# Patient Record
Sex: MALE | Race: WHITE
[De-identification: names, ages, dates, MRNs, and addresses within clinical notes are randomized per-mention and may not be internally consistent; named-entity substitution may affect disease eponyms.]

---

## 2020-02-04 ENCOUNTER — HOSPITAL ENCOUNTER (OUTPATIENT)
Dept: HOSPITAL 46 - OPS | Age: 70
Discharge: HOME | End: 2020-02-04
Attending: SURGERY
Payer: MEDICARE

## 2020-02-04 VITALS — WEIGHT: 202.01 LBS | BODY MASS INDEX: 30.62 KG/M2 | HEIGHT: 68 IN

## 2020-02-04 DIAGNOSIS — K57.30: ICD-10-CM

## 2020-02-04 DIAGNOSIS — D12.3: ICD-10-CM

## 2020-02-04 DIAGNOSIS — Z86.010: ICD-10-CM

## 2020-02-04 DIAGNOSIS — D12.2: ICD-10-CM

## 2020-02-04 DIAGNOSIS — Z79.899: ICD-10-CM

## 2020-02-04 DIAGNOSIS — Z98.890: ICD-10-CM

## 2020-02-04 DIAGNOSIS — Z88.1: ICD-10-CM

## 2020-02-04 DIAGNOSIS — Z12.11: Primary | ICD-10-CM

## 2020-02-04 PROCEDURE — G0500 MOD SEDAT ENDO SERVICE >5YRS: HCPCS

## 2020-02-04 PROCEDURE — 0DBL8ZZ EXCISION OF TRANSVERSE COLON, VIA NATURAL OR ARTIFICIAL OPENING ENDOSCOPIC: ICD-10-PCS | Performed by: SURGERY

## 2020-02-04 PROCEDURE — 0DBK8ZZ EXCISION OF ASCENDING COLON, VIA NATURAL OR ARTIFICIAL OPENING ENDOSCOPIC: ICD-10-PCS | Performed by: SURGERY

## 2020-02-04 NOTE — NUR
02/04/20 1656 Elizabeth Rae 1547 PT ARRIVED IN PACU SLEEPY. ABD FIRM. ENCOURAGED TO PASS
FLATUS. 1600 DR AT BEDSIDE TALKING WITH PT. 1615 PASSING FLATUS. ABD
SOFT. 1630 SITTING AT SIDE OF BED SIPPING ON WATER. 1645 DC
INSTRUCTIONS GIVEN. ALL QUESTIONS ANSWERED.

## 2020-02-05 NOTE — OR
[facility]
                                    [address], [place]  [address]
_________________________________________________________________________________________
                                                                 Signed   
 
 
DATE OF OPERATION:
02/04/2020
 
SURGEON:
Dilcia Lopez MD
 
PREOPERATIVE DIAGNOSIS:
History of polyps, last colonoscopy 10 years ago.
 
POSTOPERATIVE DIAGNOSES:
1. Extensive diverticulosis of left colon and transverse colon.
2. Polyps x3 (splenic flexure and right mid colon).
 
PROCEDURE:
Total colonoscopy to cecum with cold snare polypectomy x2, cold morcellation polypectomy
x1. 
 
ANESTHESIA:
Intravenous sedation, fentanyl 200 mcg, Versed 7 mg.
 
INDICATION:
A 69-year-old white man, a patient of Dr. Tr Issa at [facility], is
here for surveillance colonoscopy.  His last colonoscopy was 10 years ago.  Five years
prior to that, he was noted to have polyps.  He has no symptoms of bleeding, diarrhea,
or constipation.  He is admitted to undergo colonoscopy.  He understands the risk of
bleeding, infection, perforation. 
 
FINDINGS:
The prep was excellent.  Complete colonoscopy was undertaken of the cecum without
question.  He had extensive diverticular changes of sigmoid and left colon.  There were
2 polyps, one a bit more sessile and flat at the splenic flexure excised with cold snare
technique and another in the mid ascending colon, smaller, but also similarly excised.
Another small polyp near the right colon polyp was excised with cold morcellation
technique.  There were no complications. 
 
DESCRIPTION OF PROCEDURE:
The patient was brought to endoscopy suite and placed in lateral decubitus position,
given intravenous sedation to the point of slurred speech and nystagmus.  Full
cardiopulmonary monitoring was maintained.  Digital rectal examination was normal. 
 
An Olympus video colonoscope was passed in the rectum and manipulated into the sigmoid
where numerous diverticula were seen.  With various maneuvers, the scope was passed
 
    Electronically Signed By: DILCIA LOPEZ MD  02/05/20 1144
_________________________________________________________________________________________
PATIENT NAME:     RAINE ADAME                        
MEDICAL RECORD #: [id number]            OPERATIVE REPORT              
          ACCT #: [id number]  
DATE OF BIRTH:   [birth year]            REPORT #: [id number]      
PHYSICIAN:        DILCIA LOPEZ MD                 
PCP:              NO PRIMARY CARE PHYSICIAN     
REPORT IS CONFIDENTIAL AND NOT TO BE RELEASED WITHOUT AUTHORIZATION
 
 
                                  [facility]
                                    [address], [place]  [address]
_________________________________________________________________________________________
                                                                 Signed   
 
 
beyond this.  At about the splenic flexure was a sessile, somewhat multilobulated polyp.
 It was not pedunculated.  This was excised with cold snare technique without problem in
its entirety.  It was removed and passed for Pathology.  Scope was advanced beyond this
ultimately to the cecum.  Upon reaching the cecum, the scope was carefully withdrawn and
in the mid ascending colon were 2 small polyps, one was excised with combination of cold
snare and cold morcellation technique and the other with cold polypectomy technique.
Both were seen sent in the same container considering the proximity.  The scope was then
withdrawn further and there were no other findings other than the diverticula.
Retroflexed view of the rectum was normal.  Scope was removed.  The patient was taken to
recovery room in good condition. 
 
CONCLUDING DIAGNOSIS:
Polyps x3.
 
PLAN:
Recommend repeat colonoscopy in 3 years, sooner if clinically indicated.  Recommend
high-fiber diet as well.  He will return to the ongoing care of Dr. Tr Issa at
the [facility]. 
 
 
 
            ________________________________________
            Dilcia Lopez MD 
 
 
JM/MODL
Job #:  [id number]
DD:  02/04/2020 15:49:57
DT:  02/04/2020 21:24:17
 
cc:            Tr Issa MD
 
 
Copies:  TR ISSA MD
~
 
 
 
 
 
 
 
 
    Electronically Signed By: DILCIA LOPEZ MD  02/05/20 1144
_________________________________________________________________________________________
PATIENT NAME:     RAINE ADAME SHIVANI                        
MEDICAL RECORD #: [id number]            OPERATIVE REPORT              
          ACCT #: [id number]  
DATE OF BIRTH:   [birth year]            REPORT #: [id number]      
PHYSICIAN:        DILCIA LOPEZ MD                 
PCP:              NO PRIMARY CARE PHYSICIAN     
REPORT IS CONFIDENTIAL AND NOT TO BE RELEASED WITHOUT AUTHORIZATION

## 2020-02-06 NOTE — PATH
[facility]
                                    [address]
                                  [place], [place]  [address]
_________________________________________________________________________________________
                                                                 Signed   
 
 
 
SPECIMEN(S): A SIGMOID POLYP
SPECIMEN(S): B RIGHT MID COLON POLYPS
 
SPECIMEN SOURCE:
A. SIGMOID POLYP
B. RIGHT MID COLON POLYPS
 
CLINICAL HISTORY:
Hx of polyps.  Post: Colon polyps, diverticulosis.
MICROSCOPIC DESCRIPTION:
Histologic sections of all submitted blocks are examined by light microscopy. 
These findings, together with the gross examination, support the pathologic 
diagnosis. 
 
FINAL PATHOLOGIC DIAGNOSIS:
A.  Colon, splenic flexure, polyp, polypectomy:
-  Fragments of tubular adenoma.
-  Negative for high-grade dysplasia or malignancy.
B.  Colon, mid ascending, polyps, polypectomy:
-  Fragments of tubular adenoma(s).
-  Negative for high-grade dysplasia or malignancy.
NAL:cml:C2NR
 
GROSS DESCRIPTION:
Two specimens are received in two containers, labeled "DB."
A.  The specimen, labeled "DB, splenic flexure polyp," is received in formalin 
and consists of one pink-tan soft tissue fragment(s) that measure 0.7 cm in 
greatest dimension. The specimen is trisected 
and entirely submitted in cassette (A1).
B.  The specimen, labeled "DB, right colon and cardia polyps," is received in 
formalin and consists of for pink-tan soft tissue fragment(s) that measure 
0.1-0.4 cm in greatest dimension. The specimen 
is entirely submitted in cassette (B1).
JS (under the direct supervision of a pathologist)
The Gross Description was prepared using a voice recognition system.  The 
report was reviewed for accuracy; however, sound-alike word errors, addition 
and/or deletions may occur.  If there is any 
question about this report, please contact Client Services.
 
PERFORMING LABORATORY:
The technical component was performed by [facility], [address], 
 
                                                                                    
_________________________________________________________________________________________
PATIENT NAME:     RAINE ADAME                        
MEDICAL RECORD #: [id number]            PATHOLOGY                     
          ACCT #: [id number]       ACCESSION #: [id number]     
DATE OF BIRTH:   [birth year]            REPORT #: [id number]       
PHYSICIAN:        ANNE PATHOLOGY              
PCP:              NO PRIMARY CARE PHYSICIAN     
REPORT IS CONFIDENTIAL AND NOT TO BE RELEASED WITHOUT AUTHORIZATION
 
 
                                  [facility]
                                    [address], [place]  [address]
_________________________________________________________________________________________
                                                                 Signed   
 
 
[place] [address] (Medical Director: Isa Sampson MD; CLIA# [id number]). 
Professional interpretation was performed by 
[facility], [address] [address] (CLIA# [id number]). 
 
Diagnostician:  Maria Dolores Ferrer MD
Pathologist
Electronically Signed 02/06/2020
 
 
Copies:                                
~
 
 
 
 
 
 
 
 
 
 
 
 
 
 
 
 
 
 
 
 
 
 
 
 
 
 
 
 
 
 
 
                                                                                    
_________________________________________________________________________________________
PATIENT NAME:     RAINE ADAME                        
MEDICAL RECORD #: [id number]            PATHOLOGY                     
          ACCT #: [id number]       ACCESSION #: [id number]     
DATE OF BIRTH:   [birth year]            REPORT #: [id number]       
PHYSICIAN:        ANNE PATHOLOGY              
PCP:              NO PRIMARY CARE PHYSICIAN     
REPORT IS CONFIDENTIAL AND NOT TO BE RELEASED WITHOUT AUTHORIZATION

## 2025-03-16 NOTE — NUR
PATIENT DESATTED TO 87% AT REST. O2 FLOW TURNED TO 1L NC WITH IMPROVEMENT TO
94%.  PATIENT IS RESTING IN BED WITH EYES CLOSED. RESPIRATIONS EVEN AND
UNLABORED.

## 2025-03-16 NOTE — NUR
REVIEWED LATEST BP OF 86/60 (70) WITH DR. LOPEZ. NEXT LR BOLUS HUNG AND
INFUSING. ORDER RECEIVED TO CHANGE VALDOVINOS BAG TO UROMETER AND MONITOR UO Q1H.

## 2025-03-16 NOTE — NUR
ABG DRAWN FROM LEFT WRIST W/O COMPLICATION. KIERRA TEST PERFORMED FIRST SHOWING
GOOD CIRCULATION. LAB IN ROOM TO COLLECT ABG.

## 2025-03-16 NOTE — NUR
LAB IN ROOM TO DRAW LACTATE. NORMAN TUBE DRAIN EMPTIED FOR 30ML SEROSANGIUNOUS
DRAINAGE. UMBILICAL SITE DRESSING NOTED TO HAVE SHADOWING BUT UNCHANGED FROM
PREVIOUS ASSESSMENT; T-TUBE LUER LOCK SITE
NOTED TO BE SLIGHTLY LEAKING. ENSURED
LEUR LOCK IS SECURE. TRANSPARENT DRESSING IS CDI. GAUZE PLACED AT LEUR SITE.
REMAINING ABDOMINAL DRESSINGS ARE CDI. PATIENT CONTINUES TO REST. DENIES NEEDS
AT THIS TIME. CALL LIGHT IN REACH.

## 2025-03-16 NOTE — NUR
Patient resting in bed with eyes closed, even and unlabored RR on RA. VSS.
Tse draining quinn colored urine. Prepared for surgery, no further needs.

## 2025-03-16 NOTE — NUR
ATTEMPTED VALDOVINOS CATHETER PLACEMENT UNDER STERILE TECHNIQUE WITH 3
UNSUCCESSFULL ATTEMPTS; ONCE WITH A 16F, A COUDE AND LASTLY WITH A 10F.
ENCOURAGED PATIENT TO ATTEMPT TO VOID. DR. LOPEZ NOTIFIED. ORDER RECEIVED TO
HAVE UROJET AVAILABLE FOR WHEN DR. LOPEZ COMES TO SEE PT.

## 2025-03-16 NOTE — NUR
Report received from Prisca CROUCH. Patient resting in bed with eyes closed, even
and unlabored respirations. IV lines checked at bedside, patient has no
apparent needs, call light in reach. Will continue plan of care.

## 2025-03-16 NOTE — NUR
PATIENT WAKES TO TAKE SIPS OF WATER. ASSESSMENT AS CHARTED. LUNG SOUNDS CLEAR
THROUGHOUT. PATIENT AGAIN DENIES PAIN AND VERBALIZED UNDERSTANDING TO NOTIFY
NURSING STAFF IF EXPERIENCING PAIN/NEED FOR PAIN MEDICATION. NORMAN DRAIN
EMPTIED FOR 15ML OF SEROSANGUINOUS DRAINAGE. T-TUBE HAS SCANT AMOUNT OF YELLOW
DRAINAGE IN COLLECTION BAG.  DRESSINGS HAVE OLD SHADOWING, NO NEW DRAINAGE
NOTED. IV SITES FLUSHED AND REMAIN PATENT. LR INFUSING AT 65ML/HR AND ZOYSN
INFUSION CONTINUES. PATIENT DENIES NEEDS OR CONCERNS AT THIS TIME. CALL LIGHT
IN REACH.

## 2025-03-16 NOTE — NUR
IV PEPCID GIVEN AND ZOYSN HUNG. PATIENT RESTING BUT OPENS EYES TO SOUNDS IN
ROOM. DENIES PAIN; EDUCATION PROVIDED ON PAIN CONTROL AND PATIENT AGREES TO
NOTIFY RN OF INCREASE IN PAIN IF ANY.

## 2025-03-16 NOTE — NUR
Assessment complete. IV K infusing per order, IVF bolus infusing. Pt resting
in bed with no needs at this time. Lungs clear on RA, HRR. Hypotension noted,
MD aware.

## 2025-03-16 NOTE — NUR
PT HAD EXPIRATORY WHEEZES AND COARSENESS POST SURGERY , STILL COMING OUT FROM
SURGERY , ORAL AIRWAY INPLACED MOD JAW THRUST PRROVIDED BY RN , NONREBREATHER
, PT NOT WAKEFUL TO STIMULI AT THIS TIME, SUCTION DOWN ORAL AIRWAY AND NT WITH
LITTLE AMOUNT OF SECRETIONS , RACEMIC/ ALBUTERAL GIVEN , ABG DRAWN , DR LOPEZ
AND FREDA PROVIDER AT BEDSIDE . BIPAP STARTED . REPEAT ABG AT 1800 .

## 2025-03-16 NOTE — NUR
REPORT RECEIVED FROM MIKO DONALD.   PATIENT WAS RESTING IN BED WITH FAMILY AT
BEDSIDE. PATIENT REQUESTS SIPS OF WATER. BIPAP REMOVED AND PLACED ON SIMPLE
MASK ON 11L. O2 SATS MAINTAINED 97%.  IV LASIX GIVEN. IVF REDUCED TO 65ML/HR
PER ORDER. IV SITES PATENT AND FLUSH EASILY. HADLEY DRAIN NOTED TO HAVE SMALL
ABOUT OF SEROSANGUINOUS DRAINAGE; T-TUBE COLLECTION BAG HAS SCANT AMOUNT IN
BAG BUT TUBING APPEARS TO HAVE YELLOW DRAINAGE, ADJUSTED FOR DRAIN TO GRAVITY.
EDUCATION PROVIDED TO PATIENT ON NOT ATTEMPTING TO TOUCH OR ADJUST TUBES AND
ALLOW FOR NURSING STAFF TO ASSIST. PATIENT IS ALERT AND ORIENTED TO PERSON,
PLACE, TIME AND SITUATION BUT APPEARS SLIGHTY CONFUSED. SIMPLE MASK FLOW
REDUCED TO 4L WITH SATS AT 95%; SWITCHED PATIENT TO NASAL CANNULA AND
PATIENT SATTING AT 95% ON 4L. LUNG SOUNDS CLEAR TO DIM WITH SOME EXPIRATORY
WHEEZING. PATIENT DENIES PAIN OR DISCOMFORT; EDUCATION PROVIDED ON AND
REQUESTED PATIENT NOTIFY NURSING STAFF FOR PAIN CONTROL. GOWN CHANGES AND WARM
BLANKETS PROVIDED. CALL LIGHT IN REACH.

## 2025-03-16 NOTE — NUR
Dr Chang in to round. New IV to L hand. New orders for IV bolus. Pt updated on
plan of care, wife at bedside.

## 2025-03-16 NOTE — NUR
Tse bag changed to urometer for accurate UOP. Pt signs surgery consent. IVF
infusing per order. Scheduled medications administered. pt has no c/o pain,
nausea. No needs at this time, wife at bedside and updated on POC.

## 2025-03-16 NOTE — NUR
UPDATED DR. LOPEZ ON PATIENT STATUS. ORDERS RECEIVED FOR AM LABS TO INCLUDE
CBC, CMP, MAGNESIUM AND LACTATE. PATIENT'S WIFE APOORVA IN ROOM VISITING. BOTH
PATIENT AND SHE DENY NEEDS. CALL LIGHT IN REACH.

## 2025-03-16 NOTE — NUR
RAINE IS ON A 1L NC AWAKE IN BED W/HOB ELEVATED. HE WAS ABLE TO COMPLETE THE
CORNET LEVEL 5 W/O DIFFICULTY. ADDITIONALLY, RAINE WAS ABLE TO USE THE
INCENTIVE SPIROMETER AND HIT 1500, ABLE TO HOLD FOR THE COUNT OF 10 AT 1250.

## 2025-03-16 NOTE — NUR
03/16/25 1713 Casandra Jensen
1527- PT ARRIVES TO ICU ROOM 128, NON REACTIVE TO STIMULUS, OPA IN
PLACE REQUIRING JAW THRUST TO MAINTAIN AIRWAY, O2 AT 15 L PER MASK.
ABD DISTENDED, DRAINS IN PLACE AND DRESSINGS. VALDOVINOS CATHETER DRAINING
NIKUNJ URINE, NO DRAINGE FROM T TUBE, SEROSANGINOUS FLUID FROM NORMAN
DRAIN. ALL MONITORS IN PLACE. CAP REFILL SLOW IN EXTREMITIES. BP
SUPPORT GIVEN IV PER CRNA.
 
1535- PT REMAINS NON REACTIVE TO STIMULUS, O2 SATS IMPROVING TO LOW
90S, CONTINUE TO NEED JAW THRUST TO MAINTAIN AIRWAY.
 
1545- NO CHANGE IN PT STATUS, PT HAS LONG EXPIRATORY PHASE AND AUDIBLE
WHEEZING. LUNG SOUNDS WITH EXPIRATORY WHEEZES THROUGHOUT. RT TO
PERFORM NEB TREATMENT.
 
1550- PT SOUNDS OF PHLEGM IN BACK OF THROAT, RT TO SUCTION, VERY
LITTLE SUCTIONED.
 
1558- RACEMIC EPI GIVEN PER RT DUE TO PT CONTINUED PROLONGED
EXPIRATORY PHASE AND UPPER AIRWAY SOUNDS OF EXHALE. ATTEMPT TO SUCTION
AGAIN WITH LITTLE RETURN.
 
1602- SATS REMAIN IN THE LOW 90'S ON SIMPLE MASK WITH O2 TURNED UP
HIGH, NRB AT 15 L PLACED, SATS IMPROVING TO UPPER 90'S.
 
1605- DILCIA CRNA BACK AT BEDSIDE TO CHECK ON PT, RT REQUESTS TO BAG PT
WITH BVM TO INCREASE SATS. DILCIA AGREES AND ASSISTS, RT TO ALSO SET UP
BIPAP.
 
1614- OPA REMOVED, PT REMAINS NON REACTIVE TO STIMULUS. BIPAP PLACED
AT THIS TIME. ICU RN AND RT TO REMAIN AT BEDSIDE WITH PT DUE TO NON
RESPONSIVENESS. LR COMPLETE.
 
1620- CARE OF PT TURNED OVER TO ODILON RN TO REMAIN AT BEDSIDE WITH
PT. BED PLUGGED IN. VALDOVINOS CATHETER DRAINING YELLOW URINE AT THIS TIME.

## 2025-03-16 NOTE — NUR
Critical lab notify of LA 4.4. Pt failed bipap weaning trial with RT, remains
on bipap at this time. CXR- signs of pulmonary edema. RT added neb tx to EMAR.
Pt is disoriented to surroundings. Attempted to phone Dr Chagn, awaiting
return call. Family at bedside and engaged in care.

## 2025-03-16 NOTE — NUR
CXR taken, patient arouses with movement and opens eyes, slight grimace noted
and patient hands move to ABD. Patient wife at bedside, attentive to patient.
Bipap in place.

## 2025-03-16 NOTE — NUR
UROMETER EMPTIED FOR 125ML URINE. URINE APPEARS TO BE LIGHT NIKUNJ COLORED.
PATIENT RESTING WITH EYES CLOSED; RESPIRATIONS EVEN AND UNLABORED WITH RR 13,
SPO2 96% ON 4L NC. PATIENT REMAINS IN SINUS RHYTHM WITH HR IN THE 60S.  CALL
LIGHT IN REACH.

## 2025-03-16 NOTE — NUR
PATIENT GOT UP TO SIDE OF BED TO VOID. PATIENT WAS ABLE TO VOID 200 ML DARK
NIKUNJ URINE. BLADDER SCANNED FOR >700ML.  CALL PLACED TO DR. LOPEZ TO REVIEW
UO, URINARY
RETENTION, BP AND HR TREND. ORDERS RECEIVED TO PLACE VALDOVINOS CATHETER FOR
URINARY RETENTION AND GIVE 1L LR BOLUS.

## 2025-03-16 NOTE — NUR
VALDOVINOS CARE COMPLETED. EMPTIED FOR 275ML NIKUNJ COLORED URINE. UROMETER IN PLACE
FOR HOURLY U/O MONITORING. PATIENT RESTING IN BED WITH EYES CLOSED. O2 SAT 96%
ON 4L NC.

## 2025-03-16 NOTE — NUR
16F VALDOVINOS CATHETER PLACED BY DR. LOPEZ WITH ONE ATTEMPT.
UROJET ADMINISTERED BY DR LOPEZ PRIOR
TO PLACEMENT. DARK NIKUNJ COLORED URINE NOTED UPON PLACEMENT. LR BOLUS
RESTARTED.  BP 88/56 (66); ORDER RECEIVED TO GIVE ANOTHER LR BOLUS WHEN THE
FIRST IS COMPLETE.

## 2025-03-16 NOTE — NUR
PATIENT ARRIVED TO UNIT VIA STRETCHER FROM ED. BEDSIDE REPORT RECEIVED FROM
SHWETA CROUCH. PATIENT IS AO X4. HE IS ACCOMPANIED BY HIS WIFE APOORVA. PATIENT
PLEASANTLY INTERACTS WITH NURSING STAFF AND ASKS QUESTIONS ABOUT HIS CARE.
PATIENT AND WIFE INTRODUCED TO NURSING STAFF, ORIENTED TO ROOM AND CALL LIGHT
FUNCTIONS. SAFETY EDUCATION REVIEWED INCLUDING CALLING FOR ASSISTANCE WHEN
NEEDING TO GET UP. URINAL AT BEDSIDE. REVIEWED PLAN OF CARE INCLUDING NPO
STATUS. PATIENT VERBALIZED UNDERSTANDING. MOUTH SWABS PROVIDED AS WELL AS
CHAPSTICK FOR DRY MOUTH. BILATERAL PERIPHERAL IVS ARE PATENT WITH + BLOOD
RETURN. D5LR HUNG AND INFUSING. RESPIRATORY THERAPIST MELLISA IN ROOM TO GIVE
ACAPELLA AND INCENTIVE SPIROMETER EDUCATION. PATIENT WAS ON 1L NC WITH O2 SATS
97%.  O2 FLOW TURNED TO OFF. WITH SATS 93%.  EDUCATION TO PATIENT THAT HIS O2
IS BEING MONITORED THIS RN WILL INCREASE O2 FLOW BACK TO ON IF HE DESATS.
PATIENT IS DIAPHORETIC AND TACHYCARDIC WITH HR IN THE HIGH 90S TO LOW 100S.
COOL WASH CLOTH GIVEN. PATIENT WISHES TO REST. DENIED NEEDS. REQUESTED HE CALL
FOR ANY ASSISTANCE, PAIN, NAUSEA OR VOMITING. CALL LIGHT IN REACH. APOORVA WENT
HOME AND WILL RETURN IN LATER AM.

## 2025-03-16 NOTE — NUR
ZOSYN INFUSING. DR. LOPEZ CALLED WITH ORDER TO STOP LR BOLUS. PATIENT RECEIVED
518ML OF THE BOLUS PRIOR TO D/C. PATIENT RESTING AT THIS TIME. RESPIRATIONS
EVEN AND UNLABORED. REMAINS ON 1L NC WITH SPO2 93%; RR 17; HR IN THE 90S. CALL
LIGHT IN REACH.

## 2025-03-17 NOTE — NUR
GIVEN MOTRIN, IV ABX. PT FEELING MILDLY NAUSEATED AND BLOATED. WALKED 100FT IN
HALLWAY SBA. PT BELCHED SEVERAL TIMES, WHICH HE REPORTS HELPED. PT BACK IN
BED, CALL LIGHT IN REACH.

## 2025-03-17 NOTE — NUR
Assessment complete. Patient sitting up in chair, on RA, A+O. Lungs clear.
HRR. Bowel tones active. VSS. Pt denies pain. Afebrile. Tse cath draining
quinn yellow urine. Patient wife at bedside and updated on plan of care.

## 2025-03-17 NOTE — NUR
THIS RN TO CCU, BEDSIDE REPORT RECEIVED FROM MIKO DONALD. PT AMBULATES FROM CCU
 ON MED-SURG, TOLERATES THIS WELL. VSS. PT SITS UP IN RECLINER, A/O X4,
GLASSES ON, BELONGINGS IN SAH BAG. WIFE AT BEDSIDE. LUNCH ARRIVES. PT FEEDS
SELF. VALDOVINOS CATHETER IN PLACE DRAINS CLEAR YELLOW URINE TO GRAVITY. NORMAN
DRAIN COMPRESSED, SEROSANGUINEOUS OUTPUT NOTED. T-TUBE DRAINS GREEN OUTPUT.
NO REPORTS OF PAIN OR DISCOMFORT AT THIS TIME. CALL LIGHT IN REACH. PT
ORIENTED TO ROOM.

## 2025-03-17 NOTE — EKG
[facility]
                                    [address]
                                  [place], [place]  [address]
_________________________________________________________________________________________
                                                                 Signed   
 
 
Sinus tachycardia
Possible Anterior infarct , age undetermined
ST \T\ T wave abnormality, consider inferior ischemia Abnormal ECG No previous ECGs
available
Confirmed by Sami Muller DO ([id number]) on 3/17/2025 5:37:26 PM
 
 
 
 
 
 
 
 
 
 
 
 
 
 
 
 
 
 
 
 
 
 
 
 
 
 
 
 
 
 
 
 
 
 
 
 
 
 
 
 
    Electronically Signed By: SAMI MULLER DO  03/17/25 1737
_________________________________________________________________________________________
PATIENT NAME:     JEANETTE ADAMEVIRAJ GALICIA                     
MEDICAL RECORD #: [id number]                     Electrocardiogram             
          ACCT #: [id number]  
DATE OF BIRTH:   [birth year]                                       
PHYSICIAN:   SAMI MULLER DO                     REPORT #: [id number]
REPORT IS CONFIDENTIAL AND NOT TO BE RELEASED WITHOUT AUTHORIZATION

## 2025-03-17 NOTE — NUR
PATIENT OPENS EYES TO THIS RN IN ROOM. ASKS ABOUT HR AND BP. REPORTED VALUES
TO PATIENT. VSS.  UO FOR THE LAST HOUR WAS 75ML.

## 2025-03-17 NOTE — NUR
VSS. PATIENT CONTINUES TO REST WITH EYES CLOSED, RESPIRATIONS EVEN AND
UNLABORED. CONTINUES ON 2L NC.

## 2025-03-17 NOTE — NUR
PATIENT WAKES TO SOUND OF HELICOPTER. UO 60ML OVER LAST HOUR. T-TUBE NOW
DRAINING DARK GREEN OUTPUT. NORMAN DRAIN CONTINUES TO HAVE SERUSAGUINOUS
DRAINAGE HOWEVER APPEARS MORE SEROUS. PATIENT DENIES PAIN. AGREEABLE TO
GETTING OOBTC LATER THIS MORNING. CALL LIGHT IN REACH.

## 2025-03-17 NOTE — NUR
RECIEVED REPORT. VITALS, GIVEN ZOFRAN AND TYLENOL FOR NAUSEA AND ABD
DISCOMFORT. ABD BLOATED, RECOMMENDED WALKING AFTER TYLENOL AND ZOFRAN TAKE
EFFECT. PT AGREES WITH PLAN. CALL LIGHT IN REACH

## 2025-03-17 NOTE — NUR
Rounding on patient. Patient was in the bathroom when doing rounding. No bowel
movement from patient. Emptied rosario bag. No request from patient. Bed has
been lowered and call light has been placed within reach

## 2025-03-17 NOTE — NUR
UR CLINICAL REVIEW:
2 NM FOR VERSALUS-PER RN
REVIEW MEETS INPT FOR ACUTE
CHOLECYSTITIS
MEDICARE
INPT 03/16/25 @ 0704
ORDER MATCHES REG
NO AUTH REQUIRED PER
MEDICARE GUIDELINES
DISCHARGE TO HOME WHEN
STABLE

## 2025-03-17 NOTE — NUR
Report received from Prisca CROUCH. Patient sitting up in chair, on RA,
independently using I.S. VSS. Will continue plan of care.

## 2025-03-17 NOTE — NUR
Spoke with Ryne. He states he lives with his wife in a 1 story home with 1
steps. He has a cane, but does not use. He and wife are active and walk at
least 1 mile a day or use the treadmill. He is a  and uses the [facility]. He
has not had a [facility] to him in the last couple of years. He would
like a Dr. in [place]. He would like to see Dr. Storey as his wife sees her.
I will add him to the clinics google list and request Dr. Storey for a pcp and
also for a fu appt. Pt denies other needs. He has a T-tube in place draining
green fluid. Pt plans on dc to home with wife when he is medically cleared. He
denies any safety or financial concerns.

## 2025-03-17 NOTE — NUR
PATIENT RESTING QUIETLY WITH EYES CLOSED. RESPIRATIONS EVEN AND UNLABORED. RR
15. SPO2 98% ON 4LNC; O2 FLOW REDUCED TO 3L.

## 2025-03-17 NOTE — NUR
PATIENT WOKE AFTER COUGHING. AFEBRILE AT THIS TIME. LUNG SOUNDS CLEAR.
ABDOMEN REMAINS DISTENDED BUT NON-TENDER TO PALPATION; BOWEL SOUNDS ACTIVE.
PATIENT ENDORSES PAIN ONLY WHEN COUGHING BUT DENIES NEED FOR PAIN MEDICATION.
REVIEWED PLAN FOR THE REMAINDER OF SHIFT INCLUDING LAB DRAW, ABX AND GETTING
OOBTC. PATIENT AGREEABLE AND DENIED QUESTIONS OR CONCERNS. FRESH WATER
PROVIDED. CALL LIGHT IN REACH.

## 2025-03-17 NOTE — NUR
Scheduled medications administered. Pt declines pain at this time. Drains
intact, rosario cath draining. Pt in good spirits and has no needs.

## 2025-03-17 NOTE — NUR
PATIENT OOBTC; TOLERATED WELL. PATIENT ENDORSES SOME PAIN AFTER ACTIVITY.
TORADOL GIVEN PER EMAR. PATIENT IS NOW OFF NASAL CANNULA AND O2 SAT IS 93-95%
ON ROOM AIR. IVF CONTINUES TO INFUSE WITHOUT DIFFICULTY. IV ZOSYN HUNG AND
INFUSING. PATIENT IS AOX4; DOES NOT RECALL THE EVENTS OF LAST NIGHT.
PLEASANTLY CONVERSANT THIS MORNING. CALL LIGHT IN REACH.

## 2025-03-17 NOTE — NUR
PT IN BED, REQUESTS TO WALK IN HALLWAY. PAIN AND NAUSEA MILDLY IMPROVED.
UNABLE TO ASSIST WITH WALK PRESESNTLY, ASSURED PT WILL RETURN TO AMBULATE. PT
AGREES WITH PLAN.
CALL LIGHT IN REACH

## 2025-03-18 NOTE — NUR
PT UP IN RECLINER. REPORTS VERY LITTLE ABD PAIN, 1/10, PT ACCEPTED OFFER OF
PRN TYLENOL FOR HS. LSC. HRR. 1+ EDEMA TO BLE. ABD MODERATELY DISTENDED, FIRM.
NON-TENDER. DENIES NAUSEA. RIGHT ABD HADLEY DRAIN W/ SCANT SEROUS DRNG. LEFT ABD
T-TUBE W/ BILE DRNG TO GRAVITY. 2 DRSG TO ABD W/ SPOTS OF DRY DRNG. SMALL INC
TO UMBILICUS W/ STERI STRIPS. DRSG TO RIGHT ABD HADLEY DRAIN SATURATED-NEW DRAIN
SPONGE APPLIED. LH SL TENDER W/ FLUSH. RAC IV INFUSING LR. PT HAS NOT VOIDED
SINCE F/C REMOVAL-WANTS TO AMBULATE  SOON TO ENC VOID. CALL LIGHT WITHIN
REACH.

## 2025-03-18 NOTE — NUR
CNA TOOK PT ON A WALK DOWN THE HALLWAY. PT WALKED ABOUT HALF THE LENGTH AND
WALKED BACK TO ROOM. CNA HELPED PT BACK INTO BED, TOOK VS, AND RE-ESTABLISHED
SCD'S. PT STATED THE HIS PAIN WAS A 1 OR 2 AND DID NOT NEED ANY MEDICATION.
CNA LEFT PT WITH CALL LIGHT WITHIN REACH.

## 2025-03-18 NOTE — NUR
PATIENT IN CHAIR AT THIS TIME. THIS CNA REMOVED PATIENTS VALDOVINOS CATHETER PER MIKO YEN REQUEST. THIS CNA ALSO EMPTIED HADLEY DRAIN, MIKO BURCH SHOWED THIS CNA
HOW TO EMPTY T TUBE. CALL LIGHT WITHIN REACH, NO FURTHER NEEDS AT THIS TIME.

## 2025-03-18 NOTE — NUR
VISITED DURING SPIRITUAL CARE ROUNDS.  PT SUPPORTED BY WIFE IN ROOM.  BOTH IN
OVERALL GOOD SPIRITS, NO IMMEDIATE NEEDS.   PROVIDED SUPPORTIVE
PRESENCE, HOSPITALITY, PRAYER.  PT AND WIFE EXPRESSED GRATITUDE, HOPE.

## 2025-03-18 NOTE — NUR
ALERT AND ORIENTED. WIFE IN ROOM. PLANNING TO DC TO HOME WHEN MEDICALLY
STABLE. WIFE STATES SHE IS CHANGING PATIENT'S FOLLOW-UP APPOINTMENT WITH DR. STEVEN FOR ANOTHER DATE BECAUSE SHE DOES NOT FEEL HE WILL BE OUT OF THE
FACILITY BY THURSDAY. DENIES CM NEEDS AT THIS TIME.

## 2025-03-18 NOTE — NUR
Hourly rounding on patient. Patient laying in bed, board has been updated and
call light has been placed within reach. No request from patient at this time

## 2025-03-18 NOTE — NUR
PATIENT AMBULATED IN HALLWAY SBA, FROM ROOM TO ROOM 112 AND BACK. PATIENT NOW
SITTING IN CHAIR, WIFE IN ROOM. CALL LIGHT IN REACH. NO FURTHER NEEDS AT THIS
TIME.

## 2025-03-18 NOTE — NUR
Hourly rounding. Patient is sitting in his recliner. He reports feeling
better. No request from patient at this time. Wife at bedside. Call light has
been put within reach

## 2025-03-18 NOTE — NUR
CNA HELPED PT TO THE BATHROOM. PT VOIDED 300 ML. CNA HELPED PT BACK INTO BED.
CNA LEFT PT WITH SCD'S ON AND CALL LIGHT WITHIN REACH.

## 2025-03-18 NOTE — NUR
PT AMBULATES IN HALLWAY WITH SBA, TOLERATES THIS WELL. PT TO RECLINER. SITS UP
AND VISITS WITH SPOUSE. PT REPORTS FLATUS. ABDOMEN CONTINUES TO DISTENDED.
DISCUSSED IMPORTANCE OF AMBULATION IN PT'S RECOVERY PT VERBALIZES
UNDERSTANDING.

## 2025-03-18 NOTE — NUR
RECEIVED REPORT FROM MIKO YEN. PT UP IN CHAIR VISITING W/ WIFE. DENIES
NEEDS OR CONCERNS AT THIS TIME.

## 2025-03-18 NOTE — NUR
PLACED CALL TO MD WITH CONCERN FOR NO AM LABS ORDERED FOR PATIENT. RECEIVED
VERBAL ORDER FOR AM LABS. VERIFIED ORDER USING THE REPEATBACK METHOD.

## 2025-03-19 NOTE — NUR
PT SITTING UP ON EDGE OF BED EATING BREAKFAST. PT HAS NO PAIN CURRENTLY AND
HAS NO CONCERNS. PT ABD IS FIRM/TENDER NEAR INCISION SITE, BUT STATES IT FEELS
THE SAME AS IT HAS THE PAST FEW DAYS WITH NO CHANGES. PT HAS CALL LIGHT IN
REACH.

## 2025-03-19 NOTE — NUR
PT SITTING UP IN CHAIR, PT HAS WIFE IN ROOM, PT STATES HIS PAIN IS 1/10 AND
HAS NO CONCERNS AT THIS TIME CALL LIGHT IN REACH.

## 2025-03-19 NOTE — NUR
REPORT RECEIVED FROM LJ CROUCH. pt RESTING IN THE CHAIR. BOARD UPDATED. pt
DENIES ANY OTHER NEEDS AT THIS TIME. CALL LIGHT WITHIN REACH.

## 2025-03-19 NOTE — NUR
CNA OBTAINED VITALS AND I&O. PT ICE WATER REFULLED. PT STATES NO FURTHER NEEDS
AT THIS TIME. CALL LIGHT WITHIN REACH.

## 2025-03-19 NOTE — NUR
PT AMBULATED THE MED/SURG BRONSON WITH ASSISTANCE FROM THIS RN. PT TOLERATED 1
LAP WELL, BUT DID STATE " HE FELT TIRED QUICKER". PT RETURNED TO CHAIR AND HAS
NO CONCERNS AT THIS TIME CALL LIGHT IN REACH.

## 2025-03-19 NOTE — NUR
Hourly rounding. Patient was assisted to the bathroom, and back to bed. Board
has been updated and call light has been placed within reach. No request from
patient. He moves independently in the room.

## 2025-03-19 NOTE — NUR
pt AND BELONGINGS MOVED FROM MS ROOM #108 TO MS ROOM #115 PER REQUEST OF HOUSE
SUPERVISOR. ROOM NEEDED FOR FBC USE.

## 2025-03-19 NOTE — NUR
CONTRERAS REPORT RECIEVED FROM MIKO EMERY. PT LAYING IN BED WITH EYES CLOSED
CHEST RISE EQUAL BILAT. PT HAD MINIMAL PAIN DURING THE NIGHT AND HAS SLEPT
WELL THUS FAR. PT HAS CALL LIGHT IN REACH.

## 2025-03-19 NOTE — NUR
ALERT AND ORIENTED, SITTING ON EDGE OF BED. PATIENT STATES HE IS FEELING
SLIGHTLY BETTER TODAY. WIFE IN ROOM. DENIES CM NEEDS. PLAN TO DC TO HOME WITH
WIFE WHEN MEDICALLY READY. IMM LETTER DISCUSSED AND COPY OF LETTER PROVIDED.

## 2025-03-19 NOTE — NUR
PT AWAKE, REPORTED FEELING LIKE NEEDED TO HAVE BM BUT ONLY HAD FLATUS. REPORTS
PAIN TO ABD 1/10. DENIES ANY OTHER NEEDS AT THIS TIME.

## 2025-03-19 NOTE — NUR
ASSESSMENT AND VITAL SIGNS. pt C/O 2/10 PAIN. PRN AND SCEDULED MEDS
ADMINISTERED. STERI STRIPS ON ABD WITH MINIMAL DRAINAGE. HADLEY AND T TUBE IN
PLACE WITH WITH MINIMAL DRAINAGE IN BOTH TUBES. IV ABX INFUSING PER ORDER. IV
ASSESSED, WNL. IVF INFUSING PER ORDER. pt DENIES ANY OTHER NEEDS AT THIS TIME.
CALL LIGHT WITHIN REACH.

## 2025-03-19 NOTE — NUR
PT UP SITTING IN CHAIR, PT WIFE IN ROOM. PT COMPLAINED THAT HIS IV IN THE LEFT
HAND WAS BOTHERING HIM, IV FLUSHED AND LEAKED. IV REMOVED, PT HAS ALSO
REQUESTED TO HAVE CHICKEN BROTH FOR LUNCH DUE TO LACK OF APPETITE. PT HAS NO
FURTHER CONCERNS AT THIS TIME AND HAS CALL LIGHT IN REACH.

## 2025-03-19 NOTE — NUR
pt RESTING IN THE BED. pt PEED IN THE HAT 700mL. pt DENIES ANY OTHER NEEDS AT
THIS TIME. CALL LIGHT WITHIN REACH.

## 2025-03-19 NOTE — NUR
PT ATE A REGULAR DIET, PT HAD RICE AND CARROTS, PT HAS TOLERATED WELL AND HAS
NO CURRENT CONCERNS AT THIS TIME. PT HAS WIFE IN ROOM AND CALL LIGHT IN REACH.

## 2025-03-20 NOTE — NUR
PATIENT SITTING UP IN CHAIR EATING BREAKFAST. REPORTS PAIN LEVEL IS TOLLERABLE
AT THIS TIME AND LOCATION IS AROUND RLQ. AM MEDICATION ADMINSTERED. IV SITE
PATENT ABX INFUSING WNL. BOWEL TONES ACTIVE X 4 QUADRANTS. PATIENT DENIES ANY
NAUSEA, REPORTS PASSING GAS. DRAINS AND SURGICAL INCISIONS WNL. CALL LIGHT
WITHIN REACH.

## 2025-03-20 NOTE — NUR
ASSESSMENT AND VITAL SIGNS DONE. STERI STRIPS IN PLACE WITH NO NEW DRAINAGE.
pt CLAIMS HE IS PASSING GAS. T TUBE CAPPED AND HADLEY DRAIN HAS NO DRAINAGE AT
THIS TIME. WATER REFRESHED. pt DENIES ANY OTHER NEEDS AT THIS TIME. CALL LIGHT
WITHIN REACH. 1+ PITTING EDEMA IN BILAT LE. pt DENIES ANY OTHER NEEDS AT THIS
TIME. CALL LIGHT WITHIN REACH.

## 2025-03-20 NOTE — NUR
INTO SEE PATIENT. PATIENT STATES HE IS FEELING A BIT BETTER. WHEN MEDICALLY
CLEARED PATIENT WILL GO HOME WITH WIFE APOORVA. NO FUTHER CM NEEDS AT THIS
TIME.

## 2025-03-20 NOTE — NUR
PATIENT AMBULATED TO BATHROOM. VOID IN TOILET. INCISION TO ABD REMAINS WELL
APPROXIMATED STERI STRIPS IN PLACE. PATIENT RESTING IN RECLINER. NO FURTHER
NEEDS AT THIS TIME. CALL LIGHT WITHIN REACH.

## 2025-03-20 NOTE — NUR
REPORT RECEIVED FROM NIGHT SHIFT RN. PATIENT RESTING IN BED. IV SITE PATENT
AND WNL. ABD INCISION APPEARS WELL APPROXIMATED. STERI STRIPS INTACT. HADLEY DRAIN
AND T TUBE APPEAR WNL. URINE EMPTIED FROM HAT IN TOILET. PATIENT DENIES ANY
NEEDS AT THIS TIME. CALL LIGHT WIHTIN REACH.

## 2025-03-20 NOTE — NUR
PATIENT RESTING IN RECLINER EATING LUNCH. SCHEUDLED MEDICATIONS ADMINSTERED.
NO FURTHER NEEDS CALL LIGHT WITHIN REACH.

## 2025-03-20 NOTE — NUR
Patient presents with full range affect and calm mood. Spent majority of shift in lounge interacting with peers. Attended groups. Denies SI. Med compliant.    pt RESTING IN THE BED WITH EYES CLOSED. RR EVEN AND UNLABORED. CALL LIGHT
WITHIN REACH.

## 2025-03-20 NOTE — NUR
CLAVE APPLIED TO T-TUBE PER MD ORDERS. PATIENT DENIES ANY FURTHER NEEDS AT
THIS TIME. CALL LIGHT MALISSA DE LA FUENTE.

## 2025-03-20 NOTE — NUR
PATIENT AMBULATED HALLS WITH WIFE. URINAL WAS EMPTIED AND RINSED. PATIENT IS
CURRENTLY RESTING IN THEIR CHAIR. NO CARES WERE REQUESTED. CALL LIGHT AND
PERSONAL ITEMS ARE WITHIN REACH.

## 2025-03-21 NOTE — NUR
ASSESSMENT AND VITAL SIGNS DONE. pt UP TO THE BR. SCHEDULED MEDS ADMINISTERED.
WATER REFRESHED. pt DENIES AND OTHER NEEDS AT THIS TIME CALL LIGHT WITHIN
REACH.

## 2025-03-21 NOTE — NUR
REPORT RECEIVED FROM NIGHT SHIFT RN. PATIENT RESTING IN BED WATCHING TV AT
THIS TIME. NGT IN PLACE. SMALL AMOUNT OF DRAINAGE NOTED IN CANISTER ON WALL.
PATIENT DENIES ANY NEEDS AT THIS TIME. IV SITE WNL. CALL LIGHT WITHIN REACH.

## 2025-03-21 NOTE — NUR
VISITED DURING SPIRITUAL CARE ROUNDS.  PT SUPPORTED BY WIFE IN ROOM.  BOTH IN
OVERALL GOOD SPIRITS, NO IMMEDIATE NEEDS.   PROVIDED SUPPORTIVE
PRESENCE, HOSPITALITY, PRAYER.  PT AND  EXPRESSED GRATITUDE.

## 2025-03-21 NOTE — NUR
pt CALLED FOR IV PUMP ALARMING. pt IVF ALARMING. pt SL. WATER REFRESHED. pt
DENIES ANY OTHER NEEDS AT THIS TIME. CALL LIGHT WITHIN REACH.

## 2025-03-21 NOTE — NUR
THIS MORNING DURING BREAKFAST ASKED PATIENT IF HE WOULD LIKE TO TAKE A SHOWER
AND HE SAID NO BECAUSE IF HE GETS TO GO HOME HE WILL TAKE ONE AT HOME. BED
LINENS CHANGED. PATIENT SITTING UP IN HIS CHAIR.

## 2025-03-21 NOTE — NUR
AM MEDICATIONS ADMINSTERED. PATIENT UP IN BATHROOM, AMBULATING WELL. PATIENT
BACK INTO RECLINER. IV SITE REMAINS PATENT. NORMAN DRAIN WNL, SCANT AMOUNT OF
DRAINAGE NOTED. T-TUBE APPEARS TO BE WNL. SURGICAL INCISIONS WELL APPROXIMATED
NO S/SX OF INCECTION. STERI STRIPS REMAINS IN PLACE. LUNGS CTA. HEART SOUNDS
NORMAL. BOWEL TONES ACITVE X 4. PATIENT REPORTS PASSING GAS. FRESH ICE WATER
GIVEN. DENIES ANY FURTHER NEEDS. CALL LIGHT WITHIN REACH.

## 2025-03-21 NOTE — NUR
REPORT RECEIVED FROM NIGHT SHIFT RN. PATIENT RESTING IN BED, DENIES ANY PAIN
AT THIS TIME. SURGICAL SITE REMAINS WNL, NORMAN DRAIN WNL. PATIENT DENIES ANY
NEEDS AT THIS TIME. CALL LIGHT WITHIN REACH.

## 2025-03-21 NOTE — OR
[facility]
                                    [address], [place]  [address]
_________________________________________________________________________________________
                                                                 Signed   
 
 
DATE OF OPERATION:
03/16/2025
 
SURGEON:
Dilcia Lopez MD
 
TIME:
6:30 a.m.
 
PREOPERATIVE DIAGNOSIS:
Acute upper urinary retention and failed nurses Tse catheterization.
 
POSTOPERATIVE DIAGNOSIS:
Acute upper urinary retention and failed nurses Tse catheterization.
 
PROCEDURE:
Surgeon required placement of Tse catheter.
 
ANESTHESIA:
1% lidocaine jelly.
 
INDICATIONS:
74-year-old white man is admitted with acute cholecystitis and choledocholithiasis.  He
was given Lasix in the emergency room under the direction of Dr. Coles and was
transferred to the intensive care unit as a house convenience arrangement.  Found to
have some relative hypotension with systolic pressure of 85 and a pulse of 109.  A fluid
bolus was ordered, but patient has had only been able to void 200 mL.  Bladder scan
showed a retention of 700 mL. 
 
Nursing attempts to place a Tse catheter including various methods including a coude
catheter and so forth were unsuccessful and on that basis, additional assistance is
needed to decompress the bladder to allow for fluid resuscitation and other
interventions.  The patient understands the risk of catheterization including but not
limited to urethral injury and wished to proceed. 
 
FINDINGS:
The catheter was placed with a lidocaine jelly technique.  Once placed the bladder
decompressed, immediately 700 mL of dark quinn urine likely related to
hyperbilirubinemia from his underlying problem. 
 
DESCRIPTION OF PROCEDURE:
In the supine position, the meatus was prepared with a Betadine solution in a sterile
 
    Electronically Signed By: DILCIA LOPEZ MD  03/21/25 1248
_________________________________________________________________________________________
PATIENT NAME:     RAINE ADAMEN                     
MEDICAL RECORD #: [id number]            OPERATIVE REPORT              
          ACCT #: [id number]  
DATE OF BIRTH:   [birth year]            REPORT #: [id number]      
PHYSICIAN:        DILCIA LOPEZ MD                 
PCP:              RIKY STEVEN MD                
REPORT IS CONFIDENTIAL AND NOT TO BE RELEASED WITHOUT AUTHORIZATION
 
 
                                  [facility]
                                    [address] [place] [address]  [address]
_________________________________________________________________________________________
                                                                 Signed   
 
 
technique.  Lidocaine gel was infused into the urethra which was uncomfortable as would
be expected.  Standard catheterization was undertaken after testing of the balloon for
its competency.  There was mild resistance at the area of the prostatic urethra, but the
Tse catheter was placed without hazard.  The balloon inflated and decompression
immediately of dark quinn urine undertaken.  There was about 700 mL at last glance.  He 
tolerated procedure well.  By conclusion, the blood pressure was 99 systolic and pulse
100. 
 
 
 
            ________________________________________
            MD BEATRICE Lyman/JUVENAL
Job #:  [id number]
DD:  03/16/2025 06:39:24
DT:  03/16/2025 07:30:06
 
cc:            Dr. Coles
 
 
Copies:                                
~
 
 
 
 
 
 
 
 
 
 
 
 
 
 
 
 
 
 
 
    Electronically Signed By: DILCIA LOPEZ MD  03/21/25 1248
_________________________________________________________________________________________
PATIENT NAME:     RAINE ADAME                     
MEDICAL RECORD #: [id number]            OPERATIVE REPORT              
          ACCT #: [id number]  
DATE OF BIRTH:   [birth year]            REPORT #: [id number]      
PHYSICIAN:        DILCIA LOPEZ MD                 
PCP:              RIKY STEVEN MD                
REPORT IS CONFIDENTIAL AND NOT TO BE RELEASED WITHOUT AUTHORIZATION

## 2025-03-21 NOTE — PATH
[facility]
                                    [address], [place]  [address]
_________________________________________________________________________________________
                                                                 Signed   
 
 
 
**** THIS IS AN AMENDED REPORT ****
 
SPECIMEN(S): A GALLBLADDER WITH STONES
SPECIMEN(S): B COMMON DUCT STONES
 
SPECIMEN SOURCE:
A. GALLBLADDER WITH STONES
B. COMMON DUCT STONES
 
CLINICAL HISTORY:
Acute cholecystitis with choledocholithiasis
 
REASON FOR AMENDMENT:
This amended report is issued to correct the collected date. Originally 
reported as 03/14/2025; amended to 03/16/2025. The Pathologic Diagnosis remains 
unchanged. (03/21/2025) 
 
FINAL PATHOLOGIC DIAGNOSIS:
A. Gallbladder, cholecystectomy:
-  Acute and chronic necrotizing calculous cholecystitis
B. Designated "common bile duct stones":
-  Choleliths (gross diagnosis only)
BRP
 
MICROSCOPIC EXAMINATION:
Histologic sections of all submitted blocks are examined by light microscopy.  
These findings, together with the gross examination, support the pathologic 
diagnosis. 
 
GROSS DESCRIPTION:
A. The specimen, labeled and designated "[place], gallbladder with stones," is 
received in formalin and consists of 
Specimen: Previously opened/disrupted gallbladder.
Dimensions: 8.0 x 3.3 x 2.2 cm.
Serosa: Red-brown and slightly roughened.
Cystic Duct: Unobstructed, margin inked black and shaved.
Calculi: Multiple black-brown rounded/irregularly-shaped calculi (0.3-0.7 cm in 
greatest dimension, and 3.0 x 1.0 x 1.0 cm in aggregate). 
Mucosa: Red-brown and velvety.
Wall thickness: 0.3-0.5 cm.
Lymph node: No pericystic lymph nodes are grossly identified.
 
                                                                                    
_________________________________________________________________________________________
PATIENT NAME:     RAINE ADAME                     
MEDICAL RECORD #: [id number]            PATHOLOGY                     
          ACCT #: [id number]       ACCESSION #: [id number]     
DATE OF BIRTH:   [birth year]            REPORT #: [id number]       
PHYSICIAN:        ANNE PATHOLOGY              
PCP:              RIKY STEVEN MD                
REPORT IS CONFIDENTIAL AND NOT TO BE RELEASED WITHOUT AUTHORIZATION
 
 
                                  [facility]
                                    [address], [place]  [address]
_________________________________________________________________________________________
                                                                 Signed   
 
 
Additional: None.
Representative sections are submitted in (A1).
B. The specimen, labeled and designated "[place], common duct stones," is 
received in formalin and consists of seven black-brown 
rounded/irregularly-shaped calculi (0.3-0.9 cm in greatest dimension, and 
2.0 x 1.1 x 0.6 cm in aggregate).  The specimen is submitted for gross 
examination only. 
VB  (under the direct supervision of a pathologist)
 
The Gross Description was prepared using a voice recognition system. The report 
was reviewed for accuracy; however, sound-alike word errors, addition and/or 
deletions may occur. If there is any 
question about this report, please contact Client Services.
 
ADDITIONAL NOTES:
Immunohistochemical and/or in situ hybridization studies if performed in this 
case included appropriate positive controls that reacted as expected.  This 
test was developed and its performance 
characteristics determined by [facility].  It has not been cleared or 
approved by the U.S. Food and Drug Administration.  The FDA has determined that 
such clearance or approval is not 
necessary.  This test is used for clinical purposes.  It should not be regarded 
as investigational or for research.  [facility] is certified under the 
Clinical Laboratory Improvement 
Amendments of 1988 (CLIA) as qualified to perform high complexity clinical 
laboratory testing. 
 
PERFORMING LABORATORY:
Technical component was performed by [facility], [address] [place] [id number] (CLIA# [id number]). Professional interpretation was 
performed by [facility], [address] 
[facility] [address] (CLIA#: [id number]).
 
Diagnostician:  Germain Mtz MD
Pathologist
Electronically Signed 03/21/2025
 
 
Copies:                                
~
 
 
 
                                                                                    
_________________________________________________________________________________________
PATIENT NAME:     RAINE ADAME                     
MEDICAL RECORD #: [id number]            PATHOLOGY                     
          ACCT #: [id number]       ACCESSION #: [id number]     
DATE OF BIRTH:   [birth year]            REPORT #: [id number]       
PHYSICIAN:        ANNE PATHOLOGY              
PCP:              RIKY STEVEN MD                
REPORT IS CONFIDENTIAL AND NOT TO BE RELEASED WITHOUT AUTHORIZATION

## 2025-03-21 NOTE — HP
[facility]
                                    [address], [place]  [address]
_________________________________________________________________________________________
                                                                 Signed   
 
 
ADMISSION DATE:  
03/16/2025
 
REASON FOR ADMISSION:  
Acute cholecystitis with choledocholithiasis.
 
HISTORY OF PRESENT ILLNESS:  
This 74-year-old white man on Friday after eating a meal at a restaurant began having
abdominal pain and nausea and some vomiting.  He thought he had food poisoning.  His
symptoms persisted.  He presented to the emergency room at approximately 11 o'clock on
03/16/2025, where he was evaluated by Dr. Coles.  He was noted to have an elevated
white count of 16.2, and somewhat elevated liver enzymes as well as elevated bilirubin
of 4.9.  AST was 311, , alkaline phosphatase 129.  A CT scan was performed which
showed choledocholithiasis and multiple gallstones in the distal common duct as well as
gallstones in the gallbladder lumen and mild gallbladder wall thickening with
pericholecystic inflammatory changes.  He had a left UPJ renal stone 3 mm in size with
mild left hydronephrosis.  Mild bowel wall thickening at the hepatic flexure was also
noted, likely considered reactive. 
 
He was admitted to the intensive care unit for house convenience under my direction.  I
was called by his nurse that he had been given Lasix in the emergency room for uncertain
reasons and had voided only 200 mL with difficulty.  Bladder scan showed a retention of
700 mL and failure to place a Tse catheter despite multiple attempts to do so by his
nurse.  On that basis, I came in and placed a Tse catheter which shows deep quinn
urine consistent with hyperbilirubinemia.  At least 800 mL have been drained since I
presented to that. 
 
The patient had relative hypotension upon his presentation to the intensive care unit.
In the emergency room, lactic acid level was 1.2.  He was considered to have sepsis
criteria on the basis of fever, tachycardia and elevated white count.  He was initially
started on Ancef and I mentioned antibiotic was additionally ordered by me.  Flagyl was
to be given as well. 
 
My discussion with him at this time shows him not to look particularly systemically
toxic and he was surprisingly not uncomfortable as regards his bladder distention.  He
now has noted hesitancy with urination, but has never had urinary retention in the past
or other similar problem. 
 
His past medical history includes colonoscopy by me in 2020 and right inguinal hernia
performed by me in 2015.  He gets most of his general care from the [facility]
previously Dr. Archer at the [facility], but no replacement assigned to Dr. Archer as
of yet.  His only medications include a multivitamin and pumpkin seed oil __________
 
    Electronically Signed By: DILCIA LOPEZ MD  03/21/25 1248
_________________________________________________________________________________________
PATIENT NAME:     RAINE ADAME                     
MEDICAL RECORD #: [id number]            HISTORY AND PHYSICAL          
          ACCT #: [id number]  
DATE OF BIRTH:   [birth year]            REPORT #: [id number]      
PHYSICIAN:        DILCIA LOPEZ MD                 
PCP:              RIKY STEVEN MD                
REPORT IS CONFIDENTIAL AND NOT TO BE RELEASED WITHOUT AUTHORIZATION
 
 
                                  [facility]
                                    [address], [place]  [address]
_________________________________________________________________________________________
                                                                 Signed   
 
 
preparation. 
 
SOCIAL HISTORY:  
He is  and lives in [place].  He is a  of the [employer].
 
REVIEW OF SYSTEMS:  
He denies any chest pain or shortness of breath.  His only pain really is in the
epigastric area currently. 
 
PHYSICAL EXAMINATION:
GENERAL:  This is a pleasant white man who does not look systemically toxic despite his
somewhat problematic vital signs. 
VITAL SIGNS:  His pulse is 101, blood pressure 188/56, and O2 saturation 95% on 1 L
nasal cannula. 
NECK:  Trachea is midline.  Mucous membranes are profoundly dry. 
CHEST:  Shows normal respiratory excursion. 
HEART:  Pulses regular. 
ABDOMEN:  Mildly distended and firm.  There is marked tenderness in the epigastric area. 
EXTREMITIES:  Show no clubbing, cyanosis, or edema.  Tse catheter now placed shows
dark quinn urine. 
 
LABORATORY DATA:  
Lab studies from this morning show an elevated white count of 17.4, hematocrit 38.2,
platelets 158,000.  Chem profile shows a morning lab with decreased potassium of 3.2,
creatinine of 1.36, elevated from ER of 1.25, and glucose of 137.  Lactic acid was 1.6
at 0100 hours, calcium 7.9, AST down to 211, ALT down to 211 and alkaline phosphatase at
118 also decreased.  BNP at 0100 hours 145 (normal).  His lipase at presentation was 45. 
 
I have reviewed his chest x-ray, which shows mild basilar atelectasis, but no sign of
effusion.  His abdominal CT scan does show a dilated biliary tree.  A somewhat prominent
and thickened gallbladder wall and stones.  Somewhat dilated intrahepatic biliary tree
on my examination.  The pancreas itself appears reasonably normal.  Dilated bile duct
was noted within the parenchyma of the pancreatic head and calcified gallstones in the
distal ductal area. 
 
ASSESSMENT:  
The patient has acute calculous cholecystitis with choledocholithiasis.  He is markedly
dehydrated in relation to his underlying pathologic problem and also probably Lasix
administration.  Fluid resuscitation is underway now that a reliable Tse catheter has
been placed.  He will benefit from cholecystectomy and common duct clearance.  A
laparoscopic approach would be preferred, though he may require an open procedure.  The
risk of bleeding, infection, bile duct injury, need for open surgery and so forth were
 
    Electronically Signed By: DILCIA LOPEZ MD  03/21/25 9398
_________________________________________________________________________________________
PATIENT NAME:     RAINE ADAME                     
MEDICAL RECORD #: [id number]            HISTORY AND PHYSICAL          
          ACCT #: [id number]  
DATE OF BIRTH:   [birth year]            REPORT #: [id number]      
PHYSICIAN:        DILCIA LOPEZ MD                 
PCP:              RIKY STEVEN MD                
REPORT IS CONFIDENTIAL AND NOT TO BE RELEASED WITHOUT AUTHORIZATION
 
 
                                  [facility]
                                    [address], [place]  [address]
_________________________________________________________________________________________
                                                                 Signed   
 
 
all reviewed with him in detail. 
 
At present resuscitation efforts are underway and this will include intravenous
hydration, IV antibiotics and other similar interventions. 
 
 
 
            ________________________________________
            MD BEATRICE Lyman/JOSHL
Job #:  [id number]
DD:  03/16/2025 07:00:51
DT:  03/16/2025 08:03:34
 
cc:            Dr. Coles
 
 
Copies:                                
~
 
 
 
 
 
 
 
 
 
 
 
 
 
 
 
 
 
 
 
 
 
 
    Electronically Signed By: DILCIA LOPEZ MD  03/21/25 1248
_________________________________________________________________________________________
PATIENT NAME:     RAINE ADAME                     
MEDICAL RECORD #: [id number]            HISTORY AND PHYSICAL          
          ACCT #: [id number]  
DATE OF BIRTH:   [birth year]            REPORT #: [id number]      
PHYSICIAN:        DILCIA LOPEZ MD                 
PCP:              RIKY STEVEN MD                
REPORT IS CONFIDENTIAL AND NOT TO BE RELEASED WITHOUT AUTHORIZATION

## 2025-03-21 NOTE — OR
[facility]
                                    [address], [place]  [address]
_________________________________________________________________________________________
                                                                 Signed   
 
 
DATE OF OPERATION:
03/16/2025
 
SURGEON:
Dilcia Lopez MD
 
PREOPERATIVE DIAGNOSES:
1. Acute cholecystitis with choledocholithiasis and hyperbilirubinemia.
2. Persistent hypotension and advanced dehydration.
 
POSTOPERATIVE DIAGNOSES:
1. Toxic cholangitis related to common duct stones and acute gangrenous cholecystitis.
2. Multiple obstructing common duct stones.
 
PROCEDURES:
1. Laparoscopy with gallbladder decompression.
2. Conversion to open cholecystectomy, prolonged complicated difficult.
3. Open common bile duct exploration with extraction of multiple gallstones.
4. Flexible choledochoscopy with stone basket removal of five gallstones.
5. Placement of T-tube and completion T-tube cholangiogram prolonged complicated
difficult. 
 
ANESTHESIA:
General endotracheal, Dilcia Lara CRNA,
 
DRAINS:
7 mm Samuel and #14 latex T-tube.
 
INDICATIONS:
This 74-year-old white man presented to the emergency room late last night, having had
two or three days of increasing abdominal discomfort, which he attributed to "food
poisoning."  His symptoms began one hour after eating dinner on Friday at a local
restaurant.  He had progression to a nausea and vomiting, but no diarrhea.  His symptoms
worsened to the point that he presented to the emergency room where he was evaluated by
Dr. Coles.  He had significant tenderness in the epigastric area.  Lab studies show an
elevated bilirubin to 4.9 and elevated liver enzymes.  A CT scan was performed, which
confirmed acute cholecystitis, but also dilated common bile duct with multiple
gallstones within the common duct. 
 
He was directly admitted to my service, put on initially Ancef and Flagyl and changed
ultimately to Unasyn.  I was called for admission at about midnight, but by morning he
was somewhat oliguric and with relative hypotension to the systolic pressure of 81-89
 
    Electronically Signed By: DILCIA LOPEZ MD  03/21/25 1248
_________________________________________________________________________________________
PATIENT NAME:     RAINE ADAME                     
MEDICAL RECORD #: [id number]            OPERATIVE REPORT              
          ACCT #: [id number]  
DATE OF BIRTH:   [birth year]            REPORT #: [id number]      
PHYSICIAN:        DILCIA LOPEZ MD                 
PCP:              RIKY STEVEN MD                
REPORT IS CONFIDENTIAL AND NOT TO BE RELEASED WITHOUT AUTHORIZATION
 
 
                                  [facility]
                                    [address], [place]  [address]
_________________________________________________________________________________________
                                                                 Signed   
 
 
and a pulse of 109.  Fluid boli were given; he had received a dose of Lasix while in the
emergency room for uncertain reasons.  He had no evidence of heart failure.  He was
found to have urinary retention and inability by nursing staff to pass Tse catheter
and on that basis, I saw him at approximately 6:00 a.m. and placed a Tse catheter
myself where he was noted to have quinn urine consistent with hyperbilirubinemia.  At
least 3 L of crystalloid solution were given as he was quite markedly clinically
dehydrated.  Upon adequate resuscitation, he was taken to operation at this time to
undergo cholecystectomy and common duct exploration and other indicated procedures.  He
understands the risk of bleeding, infection, bile duct injury, need for open procedure,
need for additional treatment and wished to proceed. 
 
FINDINGS:
The patient had a congested liver and intense inflammatory changes of the gallbladder
ultimately found to be gangrenous cholecystitis with severe compromise of the
gallbladder and multiple stones not only in the gallbladder, but in the common bile
duct.  They were certainly obstructive and initial cholangiogram through the needle
catheter showed no flow of bile into the biliary tree.  Ultimately, complete clearance
of the duct was accomplished by both endoscopic and open techniques and a T-tube has
been placed.  The gallbladder itself had gangrenous changes and portions of the
posterior gallbladder wall remain in situ.  There was no bile leak at conclusion of the
procedure and T-tube cholangiogram showed flow into the biliary tree with prompt
emptying into the duodenum and no sign of obstruction. 
 
DESCRIPTION OF PROCEDURE:
The patient was brought to the operating room, given a general endotracheal anesthetic.
Unasyn antibiotic had been administered and sequential compression device stockings
used.  A Tse catheter was already in place.  The abdomen is clipped and prepared with
a chlorhexidine solution and draped sterilely.  An infraumbilical incision was made.
There was notably an umbilical hernia, which was not problematic and left as is.  Using
a Karina cannula technique, the abdomen was entered.  Pneumoperitoneum achieved to a
level of 14 mmHg of carbon dioxide gas.  Intra-abdominal inspection showed no sign of
ascites or carcinomatosis.  Gallbladder was obscured from view initially.  A 10 mm
epigastric port was then placed and with a single hand manipulation, the gallbladder
could be exposed.  It was tense and quite markedly inflamed and violaceous in appearance
initially.  Two additional trocars were placed on the right side anticipating
laparoscopic cholecystectomy with cholangiogram and laparoscopic common duct
exploration.  The gallbladder was tensed enough that it could not be grasped and
therefore was decompressed, which did show green bilious fluid.  The puncture site was
grasped and elevated and blunt dissection was used to free fibrinous severe inflammatory
adhesions away from the gallbladder including the area of the duodenum.  Despite all
these efforts, attempts at grasping the infundibulum were unsuccessful and it was clear
that a conversion to open operation would be the safest course.  The trocars were
 
    Electronically Signed By: DILCIA LOPEZ MD  03/21/25 1248
_________________________________________________________________________________________
PATIENT NAME:     RAINE ADAMEN                     
MEDICAL RECORD #: [id number]            OPERATIVE REPORT              
          ACCT #: [id number]  
DATE OF BIRTH:   [birth year]            REPORT #: [id number]      
PHYSICIAN:        DILCIA LOPEZ MD                 
PCP:              RIKY STEVEN MD                
REPORT IS CONFIDENTIAL AND NOT TO BE RELEASED WITHOUT AUTHORIZATION
 
 
                                  [facility]
                                    [address], [place]  [address]
_________________________________________________________________________________________
                                                                 Signed   
 
 
removed under direct visualization showing no sign of bleeding.  Infraumbilical fascial
incision was closed with interrupted 0 Vicryl suture.  An epigastric incision was used
to provide the medial aspect of a right subcostal incision.  This was accomplished with
a 15 blade and dissection through the subcutaneous tissue with electrocautery.  The
external oblique rectus abdominis and posterior rectus sheath and attended peritoneal
were incised with electrocautery as well.  Intra-abdominal inspection showed a far worse
gallbladder than anticipated on laparoscopy with gangrenous changes quite obviously.  A
Bookwalter retractor was affixed to the table allowing for retraction and exposure of
the gallbladder.  As this had already been decompressed, a ring clamp was applied at the
apex of the gallbladder and using electrocautery, the peritoneum incised
circumferentially.  This was a bit of a bloody dissection to the intensity of
inflammation.  The posterior wall was excised largely, but there were portions that were
left in situ for safety reasons.  Multiple gallstones were noted within the gallbladder,
which were removed.  Passage in the infundibulum to allow for intraoperative
cholangiogram to the cystic duct were not possible as the cystic duct was occluded
either from edema or chronic inflammation.  On that basis, further dissection of the
infundibulum and cystic duct were undertaken with meticulous care, identifying the
cystic duct as rather small actually.  Once fully identified and also noting the common
bile duct to be dilated in a more medial position.  The clips were applied across the
cystic duct and transecting around the cystic duct and passed in the gallbladder for
final pathology. 
 
The common bile duct was distended and quite markedly inflamed so as to provide a
cholangiogram given preoperative findings on CT scan showing choledocholithiasis, a
butterfly needle catheter was bent into position and applied into the common bile duct.
Aspiration with a tuberculin syringe showed minimal amount of bile.  Intraoperative
cholangiography was undertaken with the needle cholangiogram through the common duct.
This showed free flow of contrast into the biliary tree with no advancement into the
duodenum.  There were multiple filling defects in the distal duct. 
 
The needle was removed and two stay sutures of 4-0 PDS were applied on the axial plane
of the common bile duct cephalad to the duodenum, but not so close to be cumbersome and
manipulation.  Using an 11 blade on a long handle, anterior wall of the common duct was
incised.  Surprisingly, copious irrigation of pure purulence was noted from the common
bile duct itself.  This was Gram stained and cultured.  Once purulent material was
suctioned free.  The fluid appeared to be clearish and certainly not with copious bile
by any means.  Irrigation of the duct itself allowed for movement of several dark green
multifaceted gallstones about a centimeter in size.  A Taylor balloon catheter was
passed down the common bile duct and extracted delivering several more stones.
Irrigation was undertaken more fully and no other stones were noted. 
 
A flexible choledochoscope was applied to a video camera and with persistent continuous
 
    Electronically Signed By: DILCIA LOPEZ MD  03/21/25 1248
_________________________________________________________________________________________
PATIENT NAME:     RAINE ADAME                     
MEDICAL RECORD #: [id number]            OPERATIVE REPORT              
          ACCT #: [id number]  
DATE OF BIRTH:   [birth year]            REPORT #: [id number]      
PHYSICIAN:        DILCIA LOPEZ MD                 
PCP:              RIKY STEVEN MD                
REPORT IS CONFIDENTIAL AND NOT TO BE RELEASED WITHOUT AUTHORIZATION
 
 
                                  [address]  [address]
_________________________________________________________________________________________
                                                                 Signed   
 
 
irrigation advanced into the common bile duct.  Immediately noted were several remaining
mulberry green gallstones.  These were extracted sequentially with a stone basket under
direct visualization with the endoscope.  Once all of the stones were cleared distal
examination showed the ampulla to be free of any obstruction, though the scope was too
bulky to barely pass through the ampulla into the duodenum.  Careful withdrawal of scope
through the entry point was undertaken.  The scope manipulated to pass cephalad and the
more proximal biliary tree examined.  The right and left ducts were normal as was
remaining area.  Irrigation was undertaken more fully. 
 
Plans were then made for placement of the T-tube.  It is notable that the common duct,
though dilated, was somewhat thickened as well.  A #14 T-tube was cut to the appropriate
configuration and carefully inserted into the common bile duct proximally and distally.
Manipulation of the tube showed it to be freely moving within the duct.  The
choledochotomy was reapproximated with interrupted 4-0 PDS suture.  Irrigation was
undertaken through the T-tube showing some leakage of irrigation water and therefore,
additional stitches were placed as necessary.  This provided a watertight closure. 
 
Plans were then made for T-tube cholangiogram.  Using a surgeon directed fluoroscopy
approach, the T-tube was infused with contrast showing free flow of contrast in the
biliary tree with prompt emptying into the duodenum and no sign of filling defect.  The
entry point of the duct was somewhat angulated, but I do not believe it to be
problematic for stricture formation in the future.  The isolating laparotomy packs were
removed.  Ultimately, an irrigation undertaken fully.  Through one of the 5 mm
right-sided trocar site, a 7 mm flat Samuel drain was placed in subhepatic space.  The
T-tube was brought out with generous laxity through the trocar site.  The anterior
midline and both drain and T-tube attached to skin with nylon suture. Isolating
laparotomy packs had been removed and plans were then made for closure.  The posterior
sheath and its peritoneum were reapproximated with running #1 PDS suture.  Muscular
layer was irrigated with saline solution and anterior rectus sheath similarly
approximated.  Subcutaneous tissue was irrigated and skin closed with running
subcuticular 3-0 Vicryl.  Irrigation and closure of the infraumbilical wound was
similarly undertaken and Steri-Strips were applied there.  Acticoat dressings were
applied to all sites.  Additional stabilization of the T-tube as well as the drain was
undertaken with large OpSite dressings.  At conclusion, there was not perceptible bile
from the T-tube and it was gently irrigated with saline to be sure it was clear and had
no impediment to flow. 
 
The patient was ultimately extubated and transferred to the recovery room in good
condition and suffered no complication.  Sponge, needle, and instrument counts reported
as correct x3. 
 
The operation was prolonged, complicated, and difficult lasting at least four times
 
    Electronically Signed By: DILCIA LOPEZ MD  03/21/25 1248
_________________________________________________________________________________________
PATIENT NAME:     RAINE ADAME                     
MEDICAL RECORD #: [id number]            OPERATIVE REPORT              
          ACCT #: [id number]  
DATE OF BIRTH:   [birth year]            REPORT #: [id number]      
PHYSICIAN:        DILCIA LOPEZ MD                 
PCP:              RIKY STEVEN MD                
REPORT IS CONFIDENTIAL AND NOT TO BE RELEASED WITHOUT AUTHORIZATION
 
 
                                  [address]  [address]
_________________________________________________________________________________________
                                                                 Signed   
 
 
longer than usual.  It was accomplished safely. 
 
 
 
            ________________________________________
            Dilcia Lopez MD 
 
 
JM/MODL
Job #:  [id number]
DD:  03/16/2025 16:08:58
DT:  03/16/2025 21:14:53
 
cc:            [facility] 
               [facility]
 
 
Copies:                                
~
 
 
 
 
 
 
 
 
 
 
 
 
 
 
 
 
 
 
 
 
    Electronically Signed By: DILCIA LOPEZ MD  03/21/25 1248
_________________________________________________________________________________________
PATIENT NAME:     RAINE ADAME                     
MEDICAL RECORD #: [id number]            OPERATIVE REPORT              
          ACCT #: [id number]  
DATE OF BIRTH:   [birth year]            REPORT #: [id number]      
PHYSICIAN:        DILCIA LOPEZ MD                 
PCP:              RIKY STEVEN MD                
REPORT IS CONFIDENTIAL AND NOT TO BE RELEASED WITHOUT AUTHORIZATION

## 2025-03-21 NOTE — NUR
PATIENT IS IN HIS CHAIR AT THIS TIME, CNA CHARTED VITALS AND I&O'S, WIFE IS
WITH PATIENT IN ROOM VISITING. CALL LIGHT WITH IN REACH AND NOTHING ELSE
NEEDED AT THIS TIME.

## 2025-03-22 NOTE — DS
[facility]
                                    [address], [place]  [address]
_________________________________________________________________________________________
                                                                 Signed   
 
 
ADMISSION DATE:  03/16/2025
 
DISCHARGE DATE:  03/21/2025
 
REASON FOR ADMISSION:
This 74-year-old white man was eating meal at a restaurant, began having nausea,
abdominal pain and vomiting.  He thought he had food poisoning.  He presented to the
emergency room at approximately 11 o'clock on 03/16/2025 where he was evaluated by Dr. Coles.  His white count was elevated at 16.2 with somewhat elevated liver enzymes as
well as elevated bilirubin of 4.9.  AST was 311, , and alkaline phosphatase 129.
A CT scan was performed which showed gallstones within the common bile duct as well as
multiple gallstones in the gallbladder.  Mild gallbladder wall thickening was noted and
pericholecystic inflammatory changes were noted as well.  He had a left UPJ renal stone
3 mm in size.  It was with only mild hydronephrosis.  He is admitted for further
evaluation and care. 
 
Of note, patient had been administered a dose of Lasix in the emergency room.
Subsequent discussion with emergency room physician related that he was "hypertensive"
and that was the rationale for giving it. 
 
Upon my evaluation, he was noted to be hypotensive with a blood pressure about 88
systolic with a pulse of 105.  He had low urinary output.  A bolus of fluid was
administered, but the patient was then noted to have urinary obstructive symptoms.  He
thus had what appeared to be toxic cholangitis in addition to urinary retention. 
 
The patient was admitted to the intensive care unit.
 
HOSPITAL COURSE:
Given his findings, I saw him very early in the morning and although nursing staff were
unable to pass Tse catheter, I was able to do so, which delivered some amount of
bile-stained urine.  Aggressive fluid resuscitation was undertaken as he was hypotensive
and somewhat tachycardic.  Broad-spectrum antibiotic piperacillin was given.  Lactic
acid level was noted to be 1.2, though he did meet sepsis criteria based on fever,
tachycardia, elevated white count and ultimately hypotension. 
 
Once adequately fluid resuscitated, the patient did undergo emergency operation, which
included laparoscopy with conversion to open cholecystectomy.  Operation consisted not
only of cholecystectomy, which was difficult, but also the common bile duct exploration.
He had multiple stones within the common bile duct and complete obstruction on initial
butterfly needle cholangiogram.  A transcystic duct exploration would not have been
possible as it had occluded by the time of operation.  Upon opening of the bile
duct copious purulent fluid was noted. There was essentially no bile. Flexible
 
    Electronically Signed By: DILCIA LOPEZ MD  03/22/25 1105
_________________________________________________________________________________________
PATIENT NAME:     RAINE ADAME                     
MEDICAL RECORD #: [id number]            DISCHARGE SUMMARY             
          ACCT #: [id number]  
DATE OF BIRTH:   [birth year]            REPORT #: [id number]      
PHYSICIAN:        DILCIA LOPEZ MD                 
PCP:              RIKY STEVEN MD                
REPORT IS CONFIDENTIAL AND NOT TO BE RELEASED WITHOUT AUTHORIZATION
 
 
                                  [address]  [address]
_________________________________________________________________________________________
                                                                 Signed   
 
 
choledochoscopy and
stone basket removal of five gallstones was undertaken as well.  A T-tube was placed and
completion T-tube cholangiogram showed good flow into the duodenum.  His bilirubin was
elevated to 4.2 prior to operation. 
 
His postoperative course was one of improvement once the focus of infection had been
removed.  He was maintained on broad-spectrum antibiotics, IV fluids and so forth.  He
had progressive improvement and no evidence of bile leak within the accessory drain.  A
T-tube was allowed to drain to gravity. 
 
He was able to be transferred from the Intensive Care and to the regular nursing floor.
On March 20, 2025, he underwent T-tube cholangiogram.  This showed complete
decompression of the biliary tree, passage of contrast into the duodenum without
impediment, no sign of filling defect and no sign of bile leak. 
 
By day of discharge on March 21, 2025, he is ambulating well, tolerating a regular diet
and the T-tube had been clamped off for 24 hours without symptoms.  Lab studies the day
prior to discharge showed bilirubin of 1.5, alkaline phosphatase of 111, ALT of 90, AST
of 30.  The accessory drain was removed prior to discharge. 
 
He is discharged home in good condition anticipating removal of the T-tube as an
outpatient in my office in a month or so.  He understands to be sure not to allow the
T-tube to be dislodged.  His blood cultures did return showing E coli and Klebsiella of
which were essentially pansensitive and he will be discharged with antibiotic regimen of
Levaquin and Flagyl.  Special instructions include avoidance of lifting more than 20
pounds for the next four weeks.  He should walk on a daily basis.  He will leave
Steri-Strips on the wounds.  He is permitted to shower. 
 
DISCHARGE MEDICATIONS:
Will include 
1. Levaquin 500 mg p.o. daily #5.
2. Flagyl 250 mg p.o. t.i.d. #15.
3. Flomax 0.4 mg p.o. daily #90, refill four.
4. Ibuprofen 600 mg p.o. q.6 hours as needed for moderate pain #30, refill zero.
5. Tylenol plain 500 mg tablet, two tablets p.o. q.6 hours as needed for pain #60.
6. He will continue with the usual medication which includes a multivitamin one daily.
7. Saw palmetto prostate preparation one tablet p.o. at bedtime.
8. Turmeric 500 mg p.o. daily.
9. Vitamin D3 25 mcg, 1000 units tablet p.o. daily.
10. Zinc gluconate 50 mg tablet p.o. daily.
11. __________ 500 mg p.o. daily.
 
 
    Electronically Signed By: DILCIA LOPEZ MD  03/22/25 1105
_________________________________________________________________________________________
PATIENT NAME:     RAINE DAAME                     
MEDICAL RECORD #: [id number]            DISCHARGE SUMMARY             
          ACCT #: [id number]  
DATE OF BIRTH:   [birth year]            REPORT #: [id number]      
PHYSICIAN:        DILCIA LOPEZ MD                 
PCP:              RIKY STEVEN MD                
REPORT IS CONFIDENTIAL AND NOT TO BE RELEASED WITHOUT AUTHORIZATION
 
 
                                  [facility]
                                    [address], [place]  [address]
_________________________________________________________________________________________
                                                                 Signed   
 
 
DISCHARGE DIAGNOSES:
1. Acute calculous cholecystitis with common duct stones and toxic cholangitis/sepsis.
2. Urinary retention requiring position mediated Tse catheter placement with
decompression of urinary retention. 
3. Status post laparoscopy with conversion to open cholecystectomy with cholangiogram
(butterfly needle) with excellent subsequent open common bile duct exploration and
clearance of common duct stones including flexible choledochoscopy and basket extraction
and placement of drain. 
4. Urinary outlet obstructive symptoms with probable BPH.
 
FOLLOWUP PLAN:
He will call on Monday to set up an appointment to see me in a month at which point we
will remove the drain. 
 
 
 
            ________________________________________
            MD BEATRICE Lyman/JUVENAL
Job #:  [id number]
DD:  03/21/2025 12:07:01
DT:  03/21/2025 13:10:36
 
cc:            Dr. Miranda Coles 
               [facility]
 
 
Copies:                                
~
 
 
 
 
 
 
 
 
 
 
 
 
    Electronically Signed By: DILCIA LOPEZ MD  03/22/25 1105
_________________________________________________________________________________________
PATIENT NAME:     RAINE ADAME FRIDA                     
MEDICAL RECORD #: [id number]            DISCHARGE SUMMARY             
          ACCT #: [id number]  
DATE OF BIRTH:   [birth year]            REPORT #: [id number]      
PHYSICIAN:        DILCIA LOPEZ MD                 
PCP:              RIKY STEVEN MD                
REPORT IS CONFIDENTIAL AND NOT TO BE RELEASED WITHOUT AUTHORIZATION